# Patient Record
Sex: FEMALE | Race: WHITE | NOT HISPANIC OR LATINO | URBAN - METROPOLITAN AREA
[De-identification: names, ages, dates, MRNs, and addresses within clinical notes are randomized per-mention and may not be internally consistent; named-entity substitution may affect disease eponyms.]

---

## 2020-07-31 ENCOUNTER — HOSPITAL ENCOUNTER (OUTPATIENT)
Dept: OTHER | Facility: HOSPITAL | Age: 20
Discharge: HOME | End: 2020-07-31
Attending: SPECIALIST

## 2022-01-21 ENCOUNTER — HOSPITAL ENCOUNTER (EMERGENCY)
Facility: HOSPITAL | Age: 22
Discharge: HOME | End: 2022-01-21
Attending: EMERGENCY MEDICINE | Admitting: EMERGENCY MEDICINE

## 2022-01-21 ENCOUNTER — APPOINTMENT (OUTPATIENT)
Dept: GENERAL RADIOLOGY | Facility: HOSPITAL | Age: 22
End: 2022-01-21

## 2022-01-21 VITALS
TEMPERATURE: 96.6 F | DIASTOLIC BLOOD PRESSURE: 95 MMHG | RESPIRATION RATE: 18 BRPM | OXYGEN SATURATION: 100 % | HEART RATE: 91 BPM | SYSTOLIC BLOOD PRESSURE: 144 MMHG

## 2022-01-21 DIAGNOSIS — S90.32XA CONTUSION OF LEFT FOOT, INITIAL ENCOUNTER: Primary | ICD-10-CM

## 2022-01-21 PROCEDURE — 99282 EMERGENCY DEPT VISIT SF MDM: CPT

## 2022-01-21 PROCEDURE — 73630 X-RAY EXAM OF FOOT: CPT

## 2022-01-21 NOTE — ED TRIAGE NOTES
Pt reports injury to great toe on left foot states dropped a couch on it today.     Pt arrives in triage with mask on. Triage staff wearing N95 masks and goggles.

## 2022-01-22 NOTE — ED PROVIDER NOTES
EMERGENCY DEPARTMENT ENCOUNTER    Room Number:  B03/03  Date of encounter:  1/21/2022  PCP: Henrique Lord  Historian: Patient      HPI:  Chief Complaint: foot injury       Context: Yola Last is a 21 y.o. female who presents to the ED c/o left great toe and foot injury. Patient states that just prior to arrival she was moving a couch and dropped it on her left foot and great toe. Patient states that that is having swelling, pain, and difficulty with ambulation and weightbearing. Denies any other injuries. No injury to the nail, no numbness/tingling, or bleeding.      PAST MEDICAL HISTORY  Active Ambulatory Problems     Diagnosis Date Noted   • No Active Ambulatory Problems     Resolved Ambulatory Problems     Diagnosis Date Noted   • No Resolved Ambulatory Problems     No Additional Past Medical History         PAST SURGICAL HISTORY  No past surgical history on file.      FAMILY HISTORY  No family history on file.      SOCIAL HISTORY  Social History     Socioeconomic History   • Marital status:          ALLERGIES  Patient has no allergy information on record.        REVIEW OF SYSTEMS  Review of Systems     All systems reviewed and negative except for those discussed in HPI.       PHYSICAL EXAM    I have reviewed the triage vital signs and nursing notes.    ED Triage Vitals   Temp Heart Rate Resp BP SpO2   01/21/22 1748 01/21/22 1748 01/21/22 1748 01/21/22 1750 01/21/22 1748   96.6 °F (35.9 °C) 91 18 144/95 100 %      Temp src Heart Rate Source Patient Position BP Location FiO2 (%)   -- 01/21/22 1748 -- -- --    Monitor          Physical Exam  GENERAL: Alert and oriented x3, not distressed  HENT: mask in place  EYES: no scleral icterus  CV: regular rhythm, regular rate  RESPIRATORY: normal effort  MUSCULOSKELETAL: no deformity, normal ROM, moderate tenderness and mild soft tissue swelling at the left MCP joint, no nail injury  NEURO: alert, moves all extremities, N/V intact distally, follows commands  SKIN:  warm, dry, intact        LAB RESULTS  No results found for this or any previous visit (from the past 24 hour(s)).    Ordered the above labs and independently reviewed the results.        RADIOLOGY  XR Foot 3+ View Left    Result Date: 1/21/2022  XR FOOT 3+ VW LEFT-  01/21/2022  HISTORY: Left foot injury.  3 views of the left foot demonstrate no acute bone, joint or soft tissue abnormalities.  This report was finalized on 1/21/2022 6:50 PM by Dr. Rudolph Chambers M.D.        I ordered the above noted radiological studies. Reviewed by me and discussed with radiologist.  See dictation for official radiology interpretation.      PROCEDURES    Procedures      MEDICATIONS GIVEN IN ER    Medications - No data to display      PROGRESS, DATA ANALYSIS, CONSULTS, AND MEDICAL DECISION MAKING    All labs have been independently reviewed by me.  All radiology studies have been reviewed by me and discussed with radiologist dictating the report.   EKG's independently viewed and interpreted by me.  Discussion below represents my analysis of pertinent findings related to patient's condition, differential diagnosis, treatment plan and final disposition.    DDx: Includes but not limited to toe fracture, foot fracture, foot contusion, toe sprain, toe contusion         MDM: Patient's x-rays show no evidence for an acute fracture, patient has been advised to apply ice, elevate, and take Tylenol or ibuprofen as needed for pain. Vital signs are stable, patient is safe for discharge home.    PPE: The patient wore a surgical mask throughout the entire patient encounter. I wore an N95.    AS OF 20:36 EST VITALS:    BP - 144/95  HR - 91  TEMP - 96.6 °F (35.9 °C)  O2 SATS - 100%        DIAGNOSIS  Final diagnoses:   Contusion of left foot, initial encounter         DISPOSITION  DISCHARGE    Patient discharged in stable condition.    Reviewed implications of results, diagnosis, meds, responsibility to follow up, warning signs and symptoms of  possible worsening, potential complications and reasons to return to ER.    Patient/Family voiced understanding of above instructions.    Discussed plan for discharge, as there is no emergent indication for admission. Patient referred to primary care provider for BP management due to today's BP. Pt/family is agreeable and understands need for follow up and repeat testing.  Pt is aware that discharge does not mean that nothing is wrong but it indicates no emergency is present that requires admission and they must continue care with follow-up as given below or physician of their choice.     FOLLOW-UP  Henrique Lord  [address] [facility] [address] [phone]    Schedule an appointment as soon as possible for a visit in 1 week           Medication List      No changes were made to your prescriptions during this visit.                    Eduardo Fitzgerald, PA  01/21/22 203

## 2022-01-22 NOTE — DISCHARGE INSTRUCTIONS
Apply ice and elevate, Tylenol or ibuprofen as needed for pain, follow up with your PCP for recheck as needed.